# Patient Record
Sex: FEMALE | Race: WHITE | NOT HISPANIC OR LATINO | Employment: FULL TIME | URBAN - NONMETROPOLITAN AREA
[De-identification: names, ages, dates, MRNs, and addresses within clinical notes are randomized per-mention and may not be internally consistent; named-entity substitution may affect disease eponyms.]

---

## 2023-02-27 ENCOUNTER — OFFICE VISIT (OUTPATIENT)
Dept: FAMILY MEDICINE CLINIC | Facility: CLINIC | Age: 63
End: 2023-02-27
Payer: COMMERCIAL

## 2023-02-27 VITALS
BODY MASS INDEX: 33.12 KG/M2 | WEIGHT: 194 LBS | HEIGHT: 64 IN | SYSTOLIC BLOOD PRESSURE: 126 MMHG | HEART RATE: 87 BPM | OXYGEN SATURATION: 98 % | DIASTOLIC BLOOD PRESSURE: 85 MMHG

## 2023-02-27 DIAGNOSIS — L30.9 DERMATITIS: Primary | ICD-10-CM

## 2023-02-27 DIAGNOSIS — H10.31 ACUTE CONJUNCTIVITIS OF RIGHT EYE, UNSPECIFIED ACUTE CONJUNCTIVITIS TYPE: ICD-10-CM

## 2023-02-27 PROCEDURE — 99203 OFFICE O/P NEW LOW 30 MIN: CPT | Performed by: NURSE PRACTITIONER

## 2023-02-27 PROCEDURE — 96372 THER/PROPH/DIAG INJ SC/IM: CPT | Performed by: NURSE PRACTITIONER

## 2023-02-27 RX ORDER — ZOLMITRIPTAN 5 MG/1
TABLET, FILM COATED ORAL
COMMUNITY
Start: 2023-02-20

## 2023-02-27 RX ORDER — NEOMYCIN SULFATE, POLYMYXIN B SULFATE, AND DEXAMETHASONE 3.5; 10000; 1 MG/G; [USP'U]/G; MG/G
OINTMENT OPHTHALMIC EVERY 6 HOURS SCHEDULED
Qty: 3.5 G | Refills: 0 | Status: SHIPPED | OUTPATIENT
Start: 2023-02-27 | End: 2023-03-04

## 2023-02-27 RX ORDER — DEXAMETHASONE SODIUM PHOSPHATE 4 MG/ML
8 INJECTION, SOLUTION INTRA-ARTICULAR; INTRALESIONAL; INTRAMUSCULAR; INTRAVENOUS; SOFT TISSUE ONCE
Status: COMPLETED | OUTPATIENT
Start: 2023-02-27 | End: 2023-02-27

## 2023-02-27 RX ADMIN — DEXAMETHASONE SODIUM PHOSPHATE 8 MG: 4 INJECTION, SOLUTION INTRA-ARTICULAR; INTRALESIONAL; INTRAMUSCULAR; INTRAVENOUS; SOFT TISSUE at 09:04

## 2023-02-27 NOTE — PROGRESS NOTES
After obtaining consent, and per orders of Adriana YE, injection of Decadron 8mg given by Leonardo Benitez MA. Patient instructed to remain in clinic for 20 minutes afterwards, and to report any adverse reaction to me immediately. Pt tolerated well with no adverse reactions.

## 2023-02-27 NOTE — PROGRESS NOTES
"Chief Complaint  Rash and Eye Drainage    Subjective    History of Present Illness      Patient presents to [facility] for   History of Present Illness  Pt is here today with c/o of a rash near her right eye. Month ago cough began with a rash following soon after. Rash was bright red and spread every where but on her back. Pt went to pcp who told her to ride it out. Pharmacist started her on antibiotic and steroid which improved cough. Rash is gradually getting better  Over the weekend rash spread to her right eye. Eye is tender to touch and blood shot.  Caused fever blisters as well       Review of Systems    I have reviewed and agree with the HPI and ROS information as above.  Adriana Rodriguez, APRN     Objective   Vital Signs:   /85   Pulse 87   Ht 162.6 cm (64\")   Wt 88 kg (194 lb)   SpO2 98%   BMI 33.30 kg/m²     BMI cannot be calculated due to outdated height or weight values.  Please input a current height/weight in Vitals and re-renter BMIFOLLOWUP in Note to pull in correct documentation based on BMI range.      Physical Exam  Constitutional:       Appearance: Normal appearance. She is well-developed.   HENT:      Head: Normocephalic and atraumatic.      Right Ear: External ear normal.      Left Ear: External ear normal.      Nose: Nose normal. No nasal tenderness or congestion.      Mouth/Throat:      Lips: Pink. No lesions.      Mouth: Mucous membranes are moist. No oral lesions.      Dentition: Normal dentition.      Pharynx: Oropharynx is clear. No pharyngeal swelling, oropharyngeal exudate or posterior oropharyngeal erythema.   Eyes:      General: Vision grossly intact. No scleral icterus.        Right eye: No discharge.         Left eye: No discharge.      Extraocular Movements: Extraocular movements intact.      Conjunctiva/sclera:      Right eye: Right conjunctiva is injected.      Left eye: Left conjunctiva is not injected.      Pupils: Pupils are equal, " round, and reactive to light.   Cardiovascular:      Rate and Rhythm: Normal rate and regular rhythm.      Heart sounds: Normal heart sounds. No murmur heard.    No gallop.   Pulmonary:      Effort: Pulmonary effort is normal.      Breath sounds: Normal breath sounds and air entry. No wheezing, rhonchi or rales.   Musculoskeletal:         General: No tenderness or deformity. Normal range of motion.      Cervical back: Full passive range of motion without pain, normal range of motion and neck supple.      Right lower leg: No edema.      Left lower leg: No edema.   Skin:     General: Skin is warm and dry.      Coloration: Skin is not jaundiced.      Findings: Rash present. Rash is macular and papular.      Comments: Rash to bilateral upper/lower extremities, abdomen, chest, and around both eyes.    Neurological:      Mental Status: She is alert and oriented to person, place, and time.      Sensory: Sensation is intact.      Motor: Motor function is intact.      Coordination: Coordination is intact.      Gait: Gait is intact.   Psychiatric:         Attention and Perception: Attention normal.         Mood and Affect: Mood and affect normal.         Behavior: Behavior is not hyperactive. Behavior is cooperative.         Thought Content: Thought content normal.         Judgment: Judgment normal.              Result Review  Data Reviewed:                   Assessment and Plan      Diagnoses and all orders for this visit:    1. Dermatitis (Primary)  -     dexamethasone (DECADRON) injection 8 mg    2. Acute conjunctivitis of right eye, unspecified acute conjunctivitis type  -     tobramycin-dexamethasone (TobraDex) 0.3-0.1 % ophthalmic ointment; Apply  to eye(s) as directed by provider 3 (Three) Times a Day for 7 days.  Dispense: 3.5 g; Refill: 0      Pt comes in to the office today with complaints of rash and right eye redness.   She saw her PCP 2 weeks ago and was told her rash was due to a viral illness and it would have  to run its course. The rash did not improve and her respiratory symptoms got worse so she was started on a Zpak and Medrol Dospak which she finished 5 days ago. Symptoms are improving but not resolved. She also has redness to right eye that started a couple of days ago. She denies any visual changes and no eye pain. We will treat with steroid injection today and start Tobradex for right eye. Pt will RTC if symptoms do not improve. If right eye symptoms worse pt will follow-up with her eye doctor.       Follow Up   Return if symptoms worsen or fail to improve.  Patient was given instructions and counseling regarding her condition or for health maintenance advice. Please see specific information pulled into the AVS if appropriate.

## 2023-03-01 ENCOUNTER — PATIENT ROUNDING (BHMG ONLY) (OUTPATIENT)
Dept: FAMILY MEDICINE CLINIC | Facility: CLINIC | Age: 63
End: 2023-03-01
Payer: COMMERCIAL

## 2023-03-01 NOTE — PROGRESS NOTES
March 1, 2023    Hello, may I speak with Azalia Prajapati?    My name is Elizabeth  Pt did not answer phone, I left vm welcoming them to our practice and left our number for her to call back.      I am  with [id number]  [facility]  [address] [place] [address]  [phone].    Before we get started may I verify your date of birth? 1960    I am calling to officially welcome you to our practice and ask about your recent visit. Is this a good time to talk?     Tell me about your visit with us. What things went well?         We're always looking for ways to make our patients' experiences even better. Do you have recommendations on ways we may improve?      Overall were you satisfied with your first visit to our practice?        I appreciate you taking the time to speak with me today. Is there anything else I can do for you?       Thank you, and have a great day.